# Patient Record
(demographics unavailable — no encounter records)

---

## 2025-01-17 NOTE — PHYSICAL EXAM
[Labia Majora] : normal [Labia Minora] : normal [Moderate] : There was moderate vaginal bleeding [Normal] : normal [Enlarged ___ wks] : enlarged [unfilled] ~Uweeks [Uterine Adnexae] : normal

## 2025-01-17 NOTE — PLAN
[FreeTextEntry1] : Pelvic sopno Just finished pack of pills and recommend stop pills for now May need EMB Will need blood work but will wait until she stops pills. Instructions/precautions

## 2025-01-17 NOTE — HISTORY OF PRESENT ILLNESS
[N] : Patient is not sexually active [Y] : Positive pregnancy history [Previously active] : previously active [Mammogramdate] : 05/20/2023 [TextBox_19] : BR4 @ [BreastSonogramDate] : 05/20/2023 [TextBox_25] : BR4 @ [PapSmeardate] : 05/24/2024 [TextBox_31] : NORMAL [BoneDensityDate] : 06/2024 [HPVDate] : 05/24/2024 [TextBox_78] : NEG [LMPDate] : 11/11/2024 [PGxTotal] : 1 [[facility]] : 1 [[facility] Center[facility]] : 1 [FreeTextEntry1] : 11/11/2024

## 2025-02-21 NOTE — ASSESSMENT
[FreeTextEntry1] : 48y F with seronegative RA p/w polyarthralgia, myalgias, generalized weakness, dry eyes, and elevated inflammatory markers. Reports of negative ELIZABETH. She has no evidence of serositis (pleural effusion, pleuritis, pericarditis), inflammatory rash, discoid rash, malar rash, cytopenias, or active synovitis on exam. May consider systemic inflammatory autoimmune rheumatic disease such as seronegative RA or other systemic inflammatory autoimmune rheumatic disease. She has multiple tender points, chronic pain, poor and nonrestorative sleep which may be secondary to chronic pain syndrome (ie, fibromyalgia). Labs and additional serologies/inflammatory markers and cytokines needed. She does not take NSAIDs due to gastric sleeve surgery. APAP has not been overly effective. Prior pt reports 70% improvement while on Humira weekly, now lessened. If no improvements by next visit, will plan to change to alternative bDMARD.  Significant wt loss since on GLP-1 antagonist drug tirzepatide.  - Previous labs reviewed - inflamm stable  - High risk drug monitoring drawn in office today - CBC/CMP needed for Humira. Disease monitoring w/ CBC, CMP, ESR, CRP, etc.  - c/w Humira 40mg SQ inj q weekly  - c/w HCQ 200mg BID   - c/w Duloxetine 60mg BID.  c/w Bupropion ER 150mg daily  - Use Cyclobenzaprine 5-10mg QHS, prn muscle spasms - renewed today  - c/w Buspirone 15mg BID - c/w all home medications as prescribed   RTO 3-4 months

## 2025-02-21 NOTE — HISTORY OF PRESENT ILLNESS
[___ Month(s) Ago] : [unfilled] month(s) ago [FreeTextEntry1] : - Pt overall still doing very well, no new or active complaints.  - polyarthralgia and myalgia seemingly more related to colder temperatures. Labs have been stable mostly. - Remains on Humira SQ inj weekly, HCQ 200mg BID and DUL 60mg BID. Uses CBP 10mg QHS. Does not use NSAIDs due to IBS/D. - Previous lab results discussed, showed ESR 13 and CRP 14, mostly unchanged w/ slight improvement from last.   - Remains on cmpd Tirzepatide on 3/4/24 with 60 lbs weight loss thus far. Feeling great since weight loss with great improvements in knee pain. Reinforced weight loss and increasing activity levels. - ROS remains unchanged otherwise.

## 2025-02-21 NOTE — PHYSICAL EXAM
[General Appearance - Alert] : alert [General Appearance - In No Acute Distress] : in no acute distress [Sclera] : the sclera and conjunctiva were normal [PERRL With Normal Accommodation] : pupils were equal in size, round, and reactive to light [Extraocular Movements] : extraocular movements were intact [Outer Ear] : the ears and nose were normal in appearance [Oropharynx] : the oropharynx was normal [Neck Appearance] : the appearance of the neck was normal [Neck Cervical Mass (___cm)] : no neck mass was observed [Jugular Venous Distention Increased] : there was no jugular-venous distention [Thyroid Diffuse Enlargement] : the thyroid was not enlarged [Thyroid Nodule] : there were no palpable thyroid nodules [Respiration, Rhythm And Depth] : normal respiratory rhythm and effort [Auscultation Breath Sounds / Voice Sounds] : lungs were clear to auscultation bilaterally [Heart Rate And Rhythm] : heart rate was normal and rhythm regular [Heart Sounds] : normal S1 and S2 [Heart Sounds Gallop] : no gallops [Murmurs] : no murmurs [Heart Sounds Pericardial Friction Rub] : no pericardial rub [Full Pulse] : the pedal pulses are present [Edema] : there was no peripheral edema [Bowel Sounds] : normal bowel sounds [Abdomen Soft] : soft [Abdomen Tenderness] : non-tender [Abdomen Mass (___ Cm)] : no abdominal mass palpated [Cervical Lymph Nodes Enlarged Posterior Bilaterally] : posterior cervical [Cervical Lymph Nodes Enlarged Anterior Bilaterally] : anterior cervical [Supraclavicular Lymph Nodes Enlarged Bilaterally] : supraclavicular [Axillary Lymph Nodes Enlarged Bilaterally] : axillary [Femoral Lymph Nodes Enlarged Bilaterally] : femoral [Inguinal Lymph Nodes Enlarged Bilaterally] : inguinal [No CVA Tenderness] : no ~M costovertebral angle tenderness [No Spinal Tenderness] : no spinal tenderness [Abnormal Walk] : normal gait [Nail Clubbing] : no clubbing  or cyanosis of the fingernails [Motor Tone] : muscle strength and tone were normal [FreeTextEntry1] : Hands- normal Wrists- normal Elbows- normal Shoulders- normal Hips- normal Knees- Patellofemoral crepitus bilaterally without effusion L>R Ankles- R mild swelling  Feet- normal [Skin Color & Pigmentation] : normal skin color and pigmentation [Skin Turgor] : normal skin turgor [] : no rash [Skin Lesions] : no skin lesions [Deep Tendon Reflexes (DTR)] : deep tendon reflexes were 2+ and symmetric [Sensation] : the sensory exam was normal to light touch and pinprick [Motor Exam] : the motor exam was normal [No Focal Deficits] : no focal deficits [Oriented To Time, Place, And Person] : oriented to person, place, and time [Impaired Insight] : insight and judgment were intact [Affect] : the affect was normal [Mood] : the mood was normal

## 2025-03-27 NOTE — PLAN
[FreeTextEntry1] : Reviewed with patient recommendation of hysteroscopy D&C polypectomy NovaSure uterine ablation.  Discussed we will send pathology and if hyperplasia or precancerous patient will need completion hysterectomy.  Reviewed that with NovaSure ablation more than 50% of women at her age group can achieve amenorrhea/decrease in bleeding.  Patient currently under treatment of near cancer and blood for anemia with unknown etiology.  Cannot perform endometrial biopsy due to endometrial thickening and possible polyp.  Patient agrees to hysteroscopy D&C polypectomy NovaSure ablation, discussed risks and benefits, discussed risk of uterine perforation.  Discussed recovery for 48 hours can return to work after that.  Pelvic rest for 2 weeks.  Patient aware as well send pathology from operating room specimen.  Will need medical clearance with PCP.  If patient has another episode of heavy bleeding will need tranexamic acid in the interim.  Message sent to Gema to start booking ASAP. All questions answered.  All written out. 30-minute preop consultation.

## 2025-06-13 NOTE — HISTORY OF PRESENT ILLNESS
[FreeTextEntry1] : This is a 49-year-old who presents for postop check  Status post hysteroscopy D&C MyoSure and NovaSure uterine ablation on May 13.  Patient has had a good postop course, had some pain on the day of the surgery but no pain/fevers/chills.  When menses were due she only had 2 drops of blood, patient very happy  Patient has copy of pathology, reviewed with her, all benign.  Patient has lost 110 pounds, feels bilateral breast pain and left breast lump.  Last breast imaging 2 years ago  Exam: General appearance well-appearing no acute distress  Breast examined in the upright and supine position.  Left breast with 1 cm mobile nontender mass, right breast no masses, fatty tissue palpated bilaterally, no lymphadenopathy nontender bilaterally  Normal external genitalia  Speculum inserted normal-appearing vagina no lesions no abnormal discharge cervix appears closed no lesions.   Bimanual exam performed. Anteverted uterus nontender no cervical motion tenderness no adnexal masses bilaterally, no adnexal tenderness bilaterally   Assessment plan:  Doing well postop, can resume all normal activities  Mastodynia and left breast lump: Rx diagnostic mammogram and breast ultrasound given.  Healthcare maintenance: Needs annual and Pap will schedule